# Patient Record
Sex: MALE | Race: WHITE | HISPANIC OR LATINO | URBAN - METROPOLITAN AREA
[De-identification: names, ages, dates, MRNs, and addresses within clinical notes are randomized per-mention and may not be internally consistent; named-entity substitution may affect disease eponyms.]

---

## 2024-03-05 ENCOUNTER — OFFICE VISIT (OUTPATIENT)
Dept: DERMATOLOGY | Facility: CLINIC | Age: 39
End: 2024-03-05
Payer: COMMERCIAL

## 2024-03-05 DIAGNOSIS — L40.0 PSORIASIS VULGARIS: Primary | ICD-10-CM

## 2024-03-05 DIAGNOSIS — L40.9 PSORIASIS: Primary | ICD-10-CM

## 2024-03-05 PROCEDURE — 99213 OFFICE O/P EST LOW 20 MIN: CPT | Performed by: NURSE PRACTITIONER

## 2024-03-05 RX ORDER — DESOXIMETASONE 2.5 MG/G
CREAM TOPICAL 2 TIMES DAILY
Qty: 60 G | Refills: 1 | Status: SHIPPED | OUTPATIENT
Start: 2024-03-05 | End: 2024-03-05

## 2024-03-05 RX ORDER — CLOBETASOL PROPIONATE 0.5 MG/G
CREAM TOPICAL 2 TIMES DAILY
Qty: 60 G | Refills: 0 | Status: SHIPPED | OUTPATIENT
Start: 2024-03-05 | End: 2024-03-19

## 2024-03-05 NOTE — PROGRESS NOTES
Subjective     Albert Bronson is a 38 y.o. male who presents for the following: Psoriasis.     Established patient in for follow up using clobetasol.     Review of Systems:  No other skin or systemic complaints other than what is documented elsewhere in the note.    The following portions of the chart were reviewed this encounter and updated as appropriate:       Skin Cancer History  No skin cancer on file.    Specialty Problems    None    Past Medical History:  Albert Bronson  has no past medical history on file.    Past Surgical History:  Albert Bronson  has no past surgical history on file.    Family History:  Patient family history is not on file.    Social History:  Albert Bronson  has no history on file for tobacco use, alcohol use, and drug use.    Allergies:  Patient has no known allergies.    Current Medications / CAM's:    Current Outpatient Medications:     desoximetasone (Topicort) 0.25 % cream, Apply topically 2 times a day., Disp: 60 g, Rfl: 1    tapinarof 1 % cream, Apply 1 Application topically once daily., Disp: 60 g, Rfl: 3     Objective   Well appearing patient in no apparent distress; mood and affect are within normal limits.    A full examination was performed including scalp, head, eyes, ears, nose, lips, neck, chest, axillae, abdomen, back, buttocks, bilateral upper extremities, bilateral lower extremities, hands, feet, fingers, toes, fingernails, and toenails. All findings within normal limits unless otherwise noted below.    Assessment/Plan   1. Psoriasis vulgaris  Left 2nd Finger Distal Interphalangeal Joint, Left Dorsal Mid 3rd Finger, Left Dorsal Mid 4th Finger, Left Proximal 5th Finger, Right Dorsal Mid 2nd Finger, Right Dorsal Mid 3rd Finger, Right Dorsal Mid 4th Finger  Minimal on exam today with a 1cm patch to left elbow and mild erythema to DIP joints.  He has been using clobetasol 0.05% cream, but he unfortunately ran out.      PLAN:  Will attempt coverage on desoximetasone 0.025%  cream twice daily, but we can change back to clobetasol 0.05% cream if needed  Will send Vtama 1% cream daily  Long discussion with patient regarding chronicity of disease that there is no available cure.    Related Procedures  Follow Up In Dermatology - Established Patient    Related Medications  tapinarof 1 % cream  Apply 1 Application topically once daily.    desoximetasone (Topicort) 0.25 % cream  Apply topically 2 times a day.

## 2024-04-08 ENCOUNTER — APPOINTMENT (OUTPATIENT)
Dept: DERMATOLOGY | Facility: CLINIC | Age: 39
End: 2024-04-08
Payer: COMMERCIAL

## 2024-06-24 ENCOUNTER — APPOINTMENT (OUTPATIENT)
Dept: DERMATOLOGY | Facility: CLINIC | Age: 39
End: 2024-06-24
Payer: COMMERCIAL

## 2024-06-24 DIAGNOSIS — L40.9 PSORIASIS: Primary | ICD-10-CM

## 2024-06-24 DIAGNOSIS — L40.0 PSORIASIS VULGARIS: ICD-10-CM

## 2024-06-24 PROCEDURE — 99214 OFFICE O/P EST MOD 30 MIN: CPT | Performed by: NURSE PRACTITIONER

## 2024-06-24 RX ORDER — PIMECROLIMUS 10 MG/G
CREAM TOPICAL 2 TIMES DAILY
Qty: 100 G | Refills: 1 | Status: SHIPPED | OUTPATIENT
Start: 2024-06-24 | End: 2025-06-24

## 2024-06-24 RX ORDER — CLOBETASOL PROPIONATE 0.5 MG/G
CREAM TOPICAL
COMMUNITY

## 2024-06-24 RX ORDER — TACROLIMUS 1 MG/G
OINTMENT TOPICAL 2 TIMES DAILY
Qty: 60 G | Refills: 1 | Status: SHIPPED | OUTPATIENT
Start: 2024-06-24 | End: 2025-06-24

## 2024-06-24 RX ORDER — CLOBETASOL PROPIONATE 0.5 MG/G
CREAM TOPICAL 2 TIMES DAILY
Qty: 60 G | Refills: 1 | Status: SHIPPED | OUTPATIENT
Start: 2024-06-24 | End: 2024-07-08

## 2024-06-24 NOTE — PROGRESS NOTES
Subjective     Albert Bronson is a 39 y.o. male who presents for the following: Psoriasis.   Established patient follow up last seen 03/2024  Last prescribed clobetasol 0.05% cream     Review of Systems:  No other skin or systemic complaints other than what is documented elsewhere in the note.    The following portions of the chart were reviewed this encounter and updated as appropriate:       Skin Cancer History  No skin cancer on file.    Specialty Problems    None    Past Medical History:  Albert Bronson  has no past medical history on file.    Past Surgical History:  Albert Bronson  has no past surgical history on file.    Family History:  Patient family history is not on file.    Social History:  Albert Bronson  has no history on file for tobacco use, alcohol use, and drug use.    Allergies:  Patient has no known allergies.    Current Medications / CAM's:    Current Outpatient Medications:     clobetasol (Temovate) 0.05 % cream, APPLY TOPICALLY TWICE DAILY FOR 14 DAYS OR UNTIL RASH CLEARS, Disp: , Rfl:     clobetasol (Temovate) 0.05 % cream, Apply topically 2 times a day for 14 days. Or until rash clears, Disp: 60 g, Rfl: 1    pimecrolimus (Elidel) 1 % cream, Apply topically 2 times a day., Disp: 100 g, Rfl: 1    tapinarof 1 % cream, Apply 1 Application topically once daily., Disp: 60 g, Rfl: 3     Objective   Well appearing patient in no apparent distress; mood and affect are within normal limits.      Assessment/Plan   1. Psoriasis  Left 2nd Finger Metacarpophalangeal Joint, Left Distal Thumb, Left Dorsal Mid 3rd Finger, Left Dorsal Mid 4th Finger, Left Dorsal Mid 5th Finger, Left Proximal 2nd Finger, Left Proximal Thumb, Left Thumb Hyponychium, Left Thumb Interphalangeal Joint, Left Thumb Proximal Nail Fold, Left Thumbnail, Right 2nd Finger Distal Interphalangeal Joint, Right Distal 2nd Finger, Right Distal Thumb, Right Dorsal Mid 3rd Finger, Right Dorsal Mid 4th Finger, Right Dorsal Mid 5th Finger, Right  Thumb Proximal Nail Fold  Well-demarcated erythematous papules and plaques with overlying silvery scale to bilateral hands at DIP and PIP in addition to I small plaque on the anterior foot approximately 1cm. He states it is tremendously itchy and clobetasol is only somewhat helpful.  BSA estimate at 1% and he has no evidence of PsA.      PLAN:  Can continue clobetasol 0.05% cream twice daily  Will attempt to use elidel 1% cream as a steroid sparring agent.   Will attempt to use phototherapy as a more aggressive treatment plan.    Related Procedures  Phototherapy treatment    Related Medications  clobetasol (Temovate) 0.05 % cream  Apply topically 2 times a day for 14 days. Or until rash clears    pimecrolimus (Elidel) 1 % cream  Apply topically 2 times a day.    2. Psoriasis vulgaris    Related Procedures  Follow Up In Dermatology - Established Patient    Related Medications  tapinarof 1 % cream  Apply 1 Application topically once daily.

## 2024-07-01 ENCOUNTER — APPOINTMENT (OUTPATIENT)
Dept: DERMATOLOGY | Facility: CLINIC | Age: 39
End: 2024-07-01
Payer: COMMERCIAL

## 2024-07-03 ENCOUNTER — APPOINTMENT (OUTPATIENT)
Dept: DERMATOLOGY | Facility: CLINIC | Age: 39
End: 2024-07-03
Payer: COMMERCIAL

## 2024-07-05 ENCOUNTER — APPOINTMENT (OUTPATIENT)
Dept: DERMATOLOGY | Facility: CLINIC | Age: 39
End: 2024-07-05
Payer: COMMERCIAL

## 2024-07-08 ENCOUNTER — APPOINTMENT (OUTPATIENT)
Dept: DERMATOLOGY | Facility: CLINIC | Age: 39
End: 2024-07-08
Payer: COMMERCIAL

## 2024-07-10 ENCOUNTER — APPOINTMENT (OUTPATIENT)
Dept: DERMATOLOGY | Facility: CLINIC | Age: 39
End: 2024-07-10
Payer: COMMERCIAL

## 2024-07-12 ENCOUNTER — APPOINTMENT (OUTPATIENT)
Dept: DERMATOLOGY | Facility: CLINIC | Age: 39
End: 2024-07-12
Payer: COMMERCIAL

## 2024-07-15 ENCOUNTER — APPOINTMENT (OUTPATIENT)
Dept: DERMATOLOGY | Facility: CLINIC | Age: 39
End: 2024-07-15
Payer: COMMERCIAL

## 2024-07-17 ENCOUNTER — APPOINTMENT (OUTPATIENT)
Dept: DERMATOLOGY | Facility: CLINIC | Age: 39
End: 2024-07-17
Payer: COMMERCIAL

## 2024-07-19 ENCOUNTER — APPOINTMENT (OUTPATIENT)
Dept: DERMATOLOGY | Facility: CLINIC | Age: 39
End: 2024-07-19
Payer: COMMERCIAL

## 2024-07-22 ENCOUNTER — APPOINTMENT (OUTPATIENT)
Dept: DERMATOLOGY | Facility: CLINIC | Age: 39
End: 2024-07-22
Payer: COMMERCIAL

## 2024-07-24 ENCOUNTER — APPOINTMENT (OUTPATIENT)
Dept: DERMATOLOGY | Facility: CLINIC | Age: 39
End: 2024-07-24
Payer: COMMERCIAL

## 2024-07-26 ENCOUNTER — APPOINTMENT (OUTPATIENT)
Dept: DERMATOLOGY | Facility: CLINIC | Age: 39
End: 2024-07-26
Payer: COMMERCIAL

## 2024-07-29 ENCOUNTER — APPOINTMENT (OUTPATIENT)
Dept: DERMATOLOGY | Facility: CLINIC | Age: 39
End: 2024-07-29
Payer: COMMERCIAL

## 2024-07-31 ENCOUNTER — APPOINTMENT (OUTPATIENT)
Dept: DERMATOLOGY | Facility: CLINIC | Age: 39
End: 2024-07-31
Payer: COMMERCIAL

## 2024-08-02 ENCOUNTER — APPOINTMENT (OUTPATIENT)
Dept: DERMATOLOGY | Facility: CLINIC | Age: 39
End: 2024-08-02
Payer: COMMERCIAL

## 2024-10-14 ENCOUNTER — APPOINTMENT (OUTPATIENT)
Dept: DERMATOLOGY | Facility: CLINIC | Age: 39
End: 2024-10-14
Payer: COMMERCIAL